# Patient Record
Sex: MALE | Race: WHITE | Employment: FULL TIME | URBAN - METROPOLITAN AREA
[De-identification: names, ages, dates, MRNs, and addresses within clinical notes are randomized per-mention and may not be internally consistent; named-entity substitution may affect disease eponyms.]

---

## 2018-02-06 ENCOUNTER — HOSPITAL ENCOUNTER (OUTPATIENT)
Dept: PHYSICAL THERAPY | Age: 48
Discharge: HOME | End: 2018-02-06
Payer: COMMERCIAL

## 2018-02-06 PROCEDURE — 97110 THERAPEUTIC EXERCISES: CPT

## 2018-02-06 PROCEDURE — 97161 PT EVAL LOW COMPLEX 20 MIN: CPT

## 2018-02-06 PROCEDURE — 97535 SELF CARE MNGMENT TRAINING: CPT

## 2018-02-06 NOTE — PROGRESS NOTES
[facility]  [address]  [place], [address]  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 Assessment/ key information: Pt is a 56year old male presenting with R ankle pain after a reported possible gastroc strain in 2017. He reports he has since reported to orthopaedics 2/2 progressing pain with ambulation. He reports his symptoms have since declined since being placed in a walking boot 5 weeks ago. He presents with impairments consisting of R ankle PF and Inv weakness grossly 4/5, minimally impaired R SLS stance stability, and limited ambulation tolerance. Pt will benefit from skilled PT to address her impairments and improve his level of function to return to prior active level of function.     Evaluation Complexity History LOW Complexity : Zero comorbidities / personal factors that will impact the outcome / POC; Examination LOW Complexity : 1-2 Standardized tests and measures addressing body structure, function, activity limitation and / or participation in recreation  ;Presentation LOW Complexity : Stable, uncomplicated  ;Clinical Decision Making MEDIUM Complexity : FOTO score of 26-74  Overall Complexity Rating: LOW   Problem List: pain affecting function, decrease strength, decrease ADL/ functional abilitiies and decrease activity tolerance   Treatment Plan may include any combination of the following: Therapeutic exercise, Therapeutic activities, Neuromuscular re-education, Physical agent/modality, Gait/balance training, Manual therapy, Patient education and Self Care training  Patient / Family readiness to learn indicated by: asking questions, trying to perform skills and interest  Persons(s) to be included in education: patient (P)  Barriers to Learning/Limitations: None  Patient Goal (s): Flexibility and strengthen.   Patient Self Reported Health Status: excellent  Rehabilitation Potential: excellent    Short Term Goals: To be accomplished in 2 weeks:   1. Patient will report performance of HEP at least 2 times per day to facilitate improved outcomes and improved self management. Long Term Goals: To be accomplished in 4 weeks:   1. Patient will report FOTO score of 77 or better to indicate significant improvement in functional status. 2. Pt will demonstrate 5/5 R ankle PF and INV MMT to improve stability during ambulation. 3. Pt will demonstrate ability to ambulate 5 minutes on TM void of pain @ 2.5 mph or better to improve ease of community ambulation and progress towards return to higher level tasks. 4. Pt will demonstrate R eccentric step down void of instability to improve ease of ADLs. Frequency / Duration: Patient to be seen 1 times per week for 5 treatments. Patient/ Caregiver education and instruction: Diagnosis, prognosis, self care, activity modification and exercises   [x]  Plan of care has been reviewed with PTA    Marlyne Homans, PT, DPT, ATC 2/6/2018 4:14 PM    ________________________________________________________________________    I certify that the above Therapy Services are being furnished while the patient is under my care. I agree with the treatment plan and certify that this therapy is necessary.     [address] [place] [address] Signature:____________________  Date:____________Time: _________    Please sign and return to [facility]  [address]  [place], [address]  [phone] fax

## 2018-02-06 NOTE — PROGRESS NOTES
PT DAILY TREATMENT NOTE/FOOT AND ANKLE EVAL 3-16    Patient Name: Sage Wright  Date:2018  : 1970  [x]  Patient  Verified  Payor: BLUE CROSS / Plan: Angeline Jimenez [id number] PPO / Product Type: PPO /    In time:2:38pm  Out time:3:18pm  Total Treatment Time (min): 40  Total Timed Codes (min): 18  1:1 Treatment Time ( only): NA   Visit #: 1 of 5    Treatment Area: Right ankle pain [M25.571]    SUBJECTIVE  Pain Level (0-10 scale): 0 \"sore\"  []constant []intermittent []improving []worsening []no change since onset    Any medication changes, allergies to medications, adverse drug reactions, diagnosis change, or new procedure performed?: [x] No    [] Yes (see summary sheet for update)  Subjective functional status/changes:     Pt reports onset of R medial ankle and calf pain after slipping in a puddle in 2017 with onset of leg pain and reported \"small tear of my calf\". He reports swelling and contusion noted the next day in the post aspect of his leg. He reports some intermittent rest, but otherwise did not treat it. He reports pain progressed in his medial ankle and plantar aspect of his foot until the end of December at which point he reported it was too painful to put a boot and he referred to orthopaedics. He has since been in a walking boot after unremarkable radiographic imaging and reports he is to be in a boot another 4 weeks. Pain has since reportedly been declining. He has been holding off from work as a . 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   Mechanism of Injury: see above  Current symptoms/Complaints: medial ankle ache  Previous Treatment/Compliance: walking boot 5 weeks  PMHx/Surgical Hx: unremarkable per pt report   Work Hx: /EMS  Living Situation: see intake  Pt Goals: See intake  Barriers: []pain []financial []time []transportation []other  Motivation: appears well motivated  Substance use: []Alcohol []Tobacco []other:   FABQ Score: []low []elevate  Cognition: A & O x 4    Other:    OBJECTIVE/EXAMINATION  Domestic Life:   Activity/Recreational Limitations:   Mobility:   Self Care:     22 min [x]Eval                  []Re-Eval     10 min Therapeutic Exercise:  [] See flow sheet : HEP creation and instruction   Rationale: increase ROM, increase strength, improve balance and increase proprioception to improve the patients ability to improve ease of ambulation. Self Care: 8 minutes pt education regarding relevant anatomy, diagnosis, prognosis, plan of care to facilitate pt self management and compliance.           With   [] TE   [] TA   [] neuro   [] other: Patient Education: [x] Review HEP    [] Progressed/Changed HEP based on:   [] positioning   [] body mechanics   [] transfers   [] heat/ice application    [] other:      Other Objective/Functional Measures:    Physical Therapy Evaluation  - Foot and Ankle    Gait: [x] Normal    [] Abnormal    [] Antalgic    [] NWB    Device:  Describe: bilat LE ER (too many toes sign), bilat genu varum, otherwise WNL    ROM/Strength  [] Unable to assess at this time      AROM        PROM            Strength (1-5)   Left Right Left Right Left  Right   Dorsiflexion 4 5 6 7 5 5   Plantarflexion 55 54   5 4   Inversion 35 33   5 4   Eversion 20 20 end range discomfort along post tibialis   5 5   Great Toe Ext         Great Toe Flex           Flexibility: [] Unable to assess at this time  Gastroc:    (L) Tightness [] WNL   [x] Min   [] Mod   [] Severe    (R) Tightness [] WNL   [x] Min   [] Mod   [] Severe  Soleus:    (L) Tightness [] WNL   [x] Min   [] Mod   [] Severe    (R) Tightness [] WNL   [x] Min   [] Mod   [] Severe  Other:      (L) Tightness [] WNL   [] Min   [] Mod   [] Severe    (R) Tightness [] WNL   [] Min   [] Mod   [] Severe    Palpation:   Location: Medial R gastroc head (distal aspect), R medial ankle into distal towards just proximal to medial malleolus along post tibial tendon   Patient's Pain Response: [x] Min   [] Mod   [] Severe  Location:  Patient's Pain Response: [] Min   [] Mod   [] Severe    Optional Tests:  Balance/Stork Test: touches/60sec (L): WNL (R): no so LOB, but increased ankle strategy use and ankle localized instability    Single Leg Hop:    (L) Distance(ft):  (R) Distance(ft):  (L)/(R)%:    (L) Time(sec):  (R) Time(sec): (L)/(R)%:     Sub-talor alignment: [] Neutral     [x] Pronation      [] Supination    Forefoot alignment:  [] Neutral     [] Varus            [] Valgus    Swelling:   Left (cm) Right (cm)   Figure 8:     Midfoot:      Malleoli Level:     MTH:        Anterior Drawer: [x] Neg    [] Pos  Posterior Drawer:  [x] Neg    [] Pos  Inversion Stress:  [x] Neg    [] Pos  Talar Tilt:   [] Neg    [] Pos  Eversion Stress:  [] Neg    [] Pos  Zita's Sign:  [] Neg    [] Pos  Blanco Test: [] Neg    [] Pos    Other tests/ comments:  SL HR: L 20 with ease, R 15 prior to compensation with fatigue/weakness limiting     Pain Level (0-10 scale) post treatment: 0 \"sore\"    ASSESSMENT/Changes in Function: PER POC. Patient will continue to benefit from skilled PT services to modify and progress therapeutic interventions, address strength deficits, analyze and address soft tissue restrictions, analyze and cue movement patterns and instruct in home and community integration to attain remaining goals. [x]  See Plan of Care  []  See progress note/recertification  []  See Discharge Summary         Progress towards goals / Updated goals:  Per POC. PLAN  []  Upgrade activities as tolerated     [x]  Continue plan of care  []  Update interventions per flow sheet       []  Discharge due to:_  [x]  Other:_1x4 weeks until released from boot. Gentle progression of ankle strengthening.  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     Julio Cesar Rivero, PT, DPT, ATC 2/6/2018  2:48 PM

## 2018-02-09 ENCOUNTER — HOSPITAL ENCOUNTER (OUTPATIENT)
Dept: PHYSICAL THERAPY | Age: 48
End: 2018-02-09
Payer: COMMERCIAL

## 2018-02-14 ENCOUNTER — HOSPITAL ENCOUNTER (OUTPATIENT)
Dept: PHYSICAL THERAPY | Age: 48
Discharge: HOME | End: 2018-02-14
Payer: COMMERCIAL

## 2018-02-14 PROCEDURE — 97140 MANUAL THERAPY 1/> REGIONS: CPT

## 2018-02-14 PROCEDURE — 97110 THERAPEUTIC EXERCISES: CPT

## 2018-02-14 NOTE — PROGRESS NOTES
PT DAILY TREATMENT NOTE 3-16    Patient Name: Dimitris Christianson  Date:2018  : 1970  [x]  Patient  Verified  Payor: BLUE KEVIN / Plan: Angeline Jimenez [id number] PPO / Product Type: PPO /    In time:1430  Out time:1503  Total Treatment Time (min): 33  Visit #: 2 of 8    Treatment Area: Right ankle pain [M25.571]    SUBJECTIVE  Pain Level (0-10 scale): 2  Any medication changes, allergies to medications, adverse drug reactions, diagnosis change, or new procedure performed?: [x] No    [] Yes (see summary sheet for update)  Subjective functional status/changes:   [] No changes reported  Pt reports being able to walk around the house some at night without the boot, without difficulty. OBJECTIVE     min []Eval                  []Re-Eval     25 min Therapeutic Exercise:  [x] See flow sheet :   Rationale: increase ROM and increase strength to improve the patients ability to perform ADLs    8 min Manual Therapy:  MFR with rolling stick along R gastroc and peroneals   Rationale: decrease pain, increase ROM and increase tissue extensibility to perform ADLs           With   [] TE   [] TA   [] neuro   [] other: Patient Education: [x] Review HEP    [] Progressed/Changed HEP based on:   [] positioning   [] body mechanics   [] transfers   [] heat/ice application    [] other:      Other Objective/Functional Measures: challenged with balance tandem balance     Pain Level (0-10 scale) post treatment: 0    ASSESSMENT/Changes in Function:   Pt reports relief of symptoms after manual; will look at light pressure [place] if he reports good response to the MFR with rolling stick today. Asked pt to bring in a shoe for his R foot for balance exercises.     Patient will continue to benefit from skilled PT services to modify and progress therapeutic interventions, address functional mobility deficits, address ROM deficits, address strength deficits, analyze and address soft tissue restrictions, analyze and cue movement patterns, analyze and modify body mechanics/ergonomics, assess and modify postural abnormalities and instruct in home and community integration to attain remaining goals. []  See Plan of Care  []  See progress note/recertification  []  See Discharge Summary         Progress towards goals / Updated goals:  Short Term Goals: To be accomplished in 2 weeks:                         1. Patient will report performance of HEP at least 2 times per day to facilitate improved outcomes and improved self management. met 2/14/2018     Long Term Goals: To be accomplished in 4 weeks:                         1. Patient will report FOTO score of 77 or better to indicate significant improvement in functional status. 2. Pt will demonstrate 5/5 R ankle PF and INV MMT to improve stability during ambulation. 3. Pt will demonstrate ability to ambulate 5 minutes on TM void of pain @ 2.5 mph or better to improve ease of community ambulation and progress towards return to higher level tasks. 4. Pt will demonstrate R eccentric step down void of instability to improve ease of ADLs.     PLAN  [x]  Upgrade activities as tolerated     [x]  Continue plan of care  [x]  Update interventions per flow sheet       []  Discharge due to:_  []  Other:_      Bree Diana PT 2/14/2018  2:38 PM    Future Appointments  Date Time Provider Violeta Whittington   2/21/2018 2:30 PM [facility]   2/28/2018 2:00 PM [facility]

## 2018-02-21 ENCOUNTER — HOSPITAL ENCOUNTER (OUTPATIENT)
Dept: PHYSICAL THERAPY | Age: 48
Discharge: HOME | End: 2018-02-21
Payer: COMMERCIAL

## 2018-02-21 PROCEDURE — 97140 MANUAL THERAPY 1/> REGIONS: CPT

## 2018-02-21 PROCEDURE — 97110 THERAPEUTIC EXERCISES: CPT

## 2018-02-21 NOTE — PROGRESS NOTES
PT DAILY TREATMENT NOTE 3-16    Patient Name: Dimitris Christianson  Date:2018  : 1970  [x]  Patient  Verified  Payor: BLUE KEVIN / Plan: Angeline Jimenez [id number] PPO / Product Type: PPO /    In time:1447  Out time:1510  Total Treatment Time (min): 23  Visit #: 3 of 8    Treatment Area: Right ankle pain [M25.571]    SUBJECTIVE  Pain Level (0-10 scale): 2  Any medication changes, allergies to medications, adverse drug reactions, diagnosis change, or new procedure performed?: [x] No    [] Yes (see summary sheet for update)  Subjective functional status/changes:   [] No changes reported  Pt arrived late, carrying boot. He states he did not want to put it back on. OBJECTIVE     min []Eval                  []Re-Eval     15 min Therapeutic Exercise:  [x] See flow sheet :   Rationale: increase ROM and increase strength to improve the patients ability to perform ADLs    8 min Manual Therapy:  MFR along R gastroc-soleus with the rolling stick   Rationale: decrease pain, increase ROM and increase tissue extensibility to perform ADLs         With   [] TE   [] TA   [] neuro   [] other: Patient Education: [x] Review HEP    [] Progressed/Changed HEP based on:   [] positioning   [] body mechanics   [] transfers   [] heat/ice application    [] other:      Other Objective/Functional Measures: fatigue out of the boot     Pain Level (0-10 scale) post treatment: 1    ASSESSMENT/Changes in Function:   Pt reports fatigue with standing exercises out of the boot; we reviewed that he is still supposed to have the boot on at home and in the community at this time. Pt demonstrated good understanding.     Patient will continue to benefit from skilled PT services to modify and progress therapeutic interventions, address functional mobility deficits, address ROM deficits, address strength deficits, analyze and address soft tissue restrictions, analyze and cue movement patterns, analyze and modify body mechanics/ergonomics, assess and modify postural abnormalities and instruct in home and community integration to attain remaining goals. []  See Plan of Care  []  See progress note/recertification  []  See Discharge Summary         Progress towards goals / Updated goals:  Short Term Goals: To be accomplished in 2 weeks:                         5. Patient will report performance of HEP at least 2 times per day to facilitate improved outcomes and improved self management. met 2/14/2018      Long Term Goals: To be accomplished in 4 weeks:                         1. Patient will report FOTO score of 77 or better to indicate significant improvement in functional status.                        3. Pt will demonstrate 5/5 R ankle PF and INV MMT to improve stability during ambulation. Not met 2/21/2018                         3. Pt will demonstrate ability to ambulate 5 minutes on TM void of pain @ 2.5 mph or better to improve ease of community ambulation and progress towards return to higher level tasks.                         4. Pt will demonstrate R eccentric step down void of instability to improve ease of ADLs.     PLAN  [x]  Upgrade activities as tolerated     [x]  Continue plan of care  [x]  Update interventions per flow sheet       []  Discharge due to:_  []  Other:_      Briana Sims PT 2/21/2018  2:57 PM    Future Appointments  Date Time Provider Violeta Whittington   2/28/2018 2:00 PM Briana Sims, PT [id number]

## 2018-03-08 ENCOUNTER — APPOINTMENT (OUTPATIENT)
Dept: PHYSICAL THERAPY | Age: 48
End: 2018-03-08

## 2018-03-13 ENCOUNTER — APPOINTMENT (OUTPATIENT)
Dept: PHYSICAL THERAPY | Age: 48
End: 2018-03-13